# Patient Record
Sex: MALE | Race: WHITE
[De-identification: names, ages, dates, MRNs, and addresses within clinical notes are randomized per-mention and may not be internally consistent; named-entity substitution may affect disease eponyms.]

---

## 2021-05-24 NOTE — REPPI
INDICATION:

DISABILITY DIAGNOSIS DETERMINATION



COMPARISON:

09/19/2011



TECHNIQUE:

PA and lateral.



FINDINGS:

The mediastinum and cardiac silhouette are normal.  The lung fields are clear and

without acute consolidation, effusion, or pneumothorax.  The skeletal structures

demonstrate degenerative changes to the thoracic spine including osteophytosis and

minimal endplate sclerosis/disc space narrowing.



IMPRESSION:

No acute cardiopulmonary process.





<Electronically signed by Brody Arteaga > 05/24/21 0941

## 2021-09-07 NOTE — REP
INDICATION:

trauma.



COMPARISON:

None.



TECHNIQUE:

Three views of the right did shoulder are provided.



FINDINGS:

The right glenohumeral and acromioclavicular joints are normally aligned.  There is

inferior glenoid spurring and there is superior inferior osteoarthritic spurring at

the acromioclavicular joint.  No fracture or subluxation is seen.



IMPRESSION:

Osteoarthritic changes.  No fracture or subluxation seen.  Otherwise negative.





<Electronically signed by Alberto Tubbs > 09/07/21 0882

## 2022-10-18 ENCOUNTER — HOSPITAL ENCOUNTER (OUTPATIENT)
Dept: HOSPITAL 53 - M RAD | Age: 67
End: 2022-10-18
Attending: NURSE PRACTITIONER
Payer: COMMERCIAL

## 2022-10-18 DIAGNOSIS — I65.29: Primary | ICD-10-CM

## 2025-01-02 ENCOUNTER — HOSPITAL ENCOUNTER (OUTPATIENT)
Dept: HOSPITAL 53 - M RAD | Age: 70
End: 2025-01-02
Payer: MEDICARE

## 2025-01-02 DIAGNOSIS — I65.21: Primary | ICD-10-CM

## 2025-02-05 ENCOUNTER — HOSPITAL ENCOUNTER (OUTPATIENT)
Dept: HOSPITAL 53 - M RAD | Age: 70
End: 2025-02-05
Payer: MEDICARE

## 2025-02-05 DIAGNOSIS — G45.8: Primary | ICD-10-CM

## 2025-02-05 PROCEDURE — 71275 CT ANGIOGRAPHY CHEST: CPT
